# Patient Record
Sex: FEMALE | Race: WHITE | URBAN - METROPOLITAN AREA
[De-identification: names, ages, dates, MRNs, and addresses within clinical notes are randomized per-mention and may not be internally consistent; named-entity substitution may affect disease eponyms.]

---

## 2020-11-12 ENCOUNTER — OFFICE VISIT (OUTPATIENT)
Dept: URBAN - METROPOLITAN AREA CLINIC 31 | Facility: CLINIC | Age: 85
End: 2020-11-12
Payer: MEDICARE

## 2020-11-12 DIAGNOSIS — H35.3231 EXUDATIVE AGE-REL MCLR DEGN, BI, WITH ACTV CHRDL NEOVAS: Primary | ICD-10-CM

## 2020-11-12 PROCEDURE — 67028 INJECTION EYE DRUG: CPT | Performed by: OPHTHALMOLOGY

## 2020-11-12 PROCEDURE — 92014 COMPRE OPH EXAM EST PT 1/>: CPT | Performed by: OPHTHALMOLOGY

## 2020-11-12 PROCEDURE — 92134 CPTRZ OPH DX IMG PST SGM RTA: CPT | Performed by: OPHTHALMOLOGY

## 2020-11-12 ASSESSMENT — INTRAOCULAR PRESSURE
OD: 19
OS: 19

## 2020-11-12 NOTE — IMPRESSION/PLAN
Impression: Vitreous degeneration, left eye: H43.812. Plan: No retinal breaks were identified on exam.  The findings were discussed with the patient. The signs and symptoms of a retinal tear and detachment were again reviewed with the patient. The patient was advised to return if they noted any new floaters, flashing lights or a shadow in their vision.

## 2020-11-12 NOTE — IMPRESSION/PLAN
Impression: Exudative age-rel mclr degn, bi, with actv chrdl neovas: H35.3231. OU.
DS OD
s/p Lucentis OS 8/27/2020  (stable at q 3 months)
s/p Lucentis OD 11/29/18 (prn) OCT OD = scar, with central IRF OD no SRF/IRF OS  with CMT  292/199 +1 Plan: OD:  Some fluid, but no IRH, and no SRH in periphery of scar OS: No activity, better eye. Rec treatment OS for maint. May consider OD as well to reduce spread of scar, as SRF in mid-periph. RBAC's d/w patient who elects obs OD today and treat OS. Discussed R,B,A of Avastin vs Lucentis vs Eylea injection. Discussed signs and symptoms of retinal detachment. Patient understands and wishes to proceed with Lucentis injection today. Timeout was performed before procedure. After 2% Subconjunctival anesthesia & betadine prep, Lucentis OS was injected with no complications. Overfill discarded. 

3m OCT OU re-eval Adarsh OU

## 2021-03-01 ENCOUNTER — OFFICE VISIT (OUTPATIENT)
Dept: URBAN - METROPOLITAN AREA CLINIC 13 | Facility: CLINIC | Age: 86
End: 2021-03-01
Payer: MEDICARE

## 2021-03-01 PROCEDURE — 67028 INJECTION EYE DRUG: CPT | Performed by: OPHTHALMOLOGY

## 2021-03-01 PROCEDURE — 92134 CPTRZ OPH DX IMG PST SGM RTA: CPT | Performed by: OPHTHALMOLOGY

## 2021-03-01 PROCEDURE — 99214 OFFICE O/P EST MOD 30 MIN: CPT | Performed by: OPHTHALMOLOGY

## 2021-03-01 ASSESSMENT — INTRAOCULAR PRESSURE
OD: 19
OS: 19

## 2021-03-01 NOTE — IMPRESSION/PLAN
Impression: Exudative age-rel mclr degn, bi, with actv chrdl neovas: H35.3231. OU.
DS OD
s/p Lucentis OS 11/12/2020  (stable at q 3 months)
s/p Lucentis OD 11/29/18 (prn) OCT OD = scar, with central IRF OD no SRF/IRF OS  with /288 Plan: OD:  Some fluid, but no IRH, and no SRH in periphery of scar OS: No activity, better eye @ 3m -- today mild fluid at 3 1/2m Rec treatment OS for maint at 3m interval.  May consider OD as well to reduce spread of scar, as SRF in mid-periph. RBAC's d/w patient who elects obs OD today and treat OS. Discussed R,B,A of Avastin vs Lucentis vs Eylea injection. Discussed signs and symptoms of retinal detachment. Patient understands and wishes to proceed with Lucentis injection today. Timeout was performed before procedure. After 2% Subconjunctival anesthesia & betadine prep, Lucentis OS was injected with no complications. Overfill discarded. 

3m OCT OU re-eval Adarsh OU

## 2021-06-10 ENCOUNTER — OFFICE VISIT (OUTPATIENT)
Dept: URBAN - METROPOLITAN AREA CLINIC 41 | Facility: CLINIC | Age: 86
End: 2021-06-10
Payer: MEDICARE

## 2021-06-10 DIAGNOSIS — H43.812 VITREOUS DEGENERATION, LEFT EYE: ICD-10-CM

## 2021-06-10 DIAGNOSIS — Z96.1 PRESENCE OF INTRAOCULAR LENS: ICD-10-CM

## 2021-06-10 PROCEDURE — 92134 CPTRZ OPH DX IMG PST SGM RTA: CPT | Performed by: OPHTHALMOLOGY

## 2021-06-10 PROCEDURE — 67028 INJECTION EYE DRUG: CPT | Performed by: OPHTHALMOLOGY

## 2021-06-10 ASSESSMENT — INTRAOCULAR PRESSURE
OD: 17
OS: 17

## 2021-06-10 NOTE — IMPRESSION/PLAN
Impression: Exudative age-rel mclr degn, bi, with actv chrdl neovas: H35.3231. OU.
DS OD
s/p Lucentis OS 03/01/2021 (stable at q 3 months)
s/p Lucentis OD 11/29/18 (prn) OCT OD = scar, with central IRF OD; increasing SRF OS  with  / 305 Plan: OD:  Some fluid, but no IRH, and no SRH in periphery of scar OS: Had no activity, better eye @ 3m -- today increased fluid at 3m Rec treatment OS with tighter interval.
May consider OD as well to reduce spread of scar, as SRF in mid-periph. RBAC's d/w patient who elects obs OD today and treat OS. Discussed R,B,A of Avastin vs Lucentis vs Eylea injection. Discussed signs and symptoms of retinal detachment. Patient understands and wishes to proceed with Lucentis injection today. Timeout was performed before procedure. After 2% Subconjunctival anesthesia & betadine prep, Lucentis OS was injected with no complications. Overfill discarded. 

1m OCT OU Adarsh OS 2/3

## 2021-07-22 ENCOUNTER — PROCEDURE (OUTPATIENT)
Dept: URBAN - METROPOLITAN AREA CLINIC 41 | Facility: CLINIC | Age: 86
End: 2021-07-22
Payer: MEDICARE

## 2021-07-22 PROCEDURE — 92134 CPTRZ OPH DX IMG PST SGM RTA: CPT | Performed by: OPHTHALMOLOGY

## 2021-07-22 ASSESSMENT — INTRAOCULAR PRESSURE
OD: 18
OS: 19

## 2021-08-23 ENCOUNTER — PROCEDURE (OUTPATIENT)
Dept: URBAN - METROPOLITAN AREA CLINIC 41 | Facility: CLINIC | Age: 86
End: 2021-08-23
Payer: MEDICARE

## 2021-08-23 PROCEDURE — 92134 CPTRZ OPH DX IMG PST SGM RTA: CPT | Performed by: OPHTHALMOLOGY

## 2021-08-23 PROCEDURE — 67028 INJECTION EYE DRUG: CPT | Performed by: OPHTHALMOLOGY

## 2021-08-23 ASSESSMENT — INTRAOCULAR PRESSURE
OS: 24
OD: 23

## 2021-10-28 ENCOUNTER — OFFICE VISIT (OUTPATIENT)
Dept: URBAN - METROPOLITAN AREA CLINIC 41 | Facility: CLINIC | Age: 86
End: 2021-10-28
Payer: MEDICARE

## 2021-10-28 PROCEDURE — 99214 OFFICE O/P EST MOD 30 MIN: CPT | Performed by: OPHTHALMOLOGY

## 2021-10-28 PROCEDURE — 67028 INJECTION EYE DRUG: CPT | Performed by: OPHTHALMOLOGY

## 2021-10-28 PROCEDURE — 92134 CPTRZ OPH DX IMG PST SGM RTA: CPT | Performed by: OPHTHALMOLOGY

## 2021-10-28 ASSESSMENT — INTRAOCULAR PRESSURE
OS: 20
OD: 19

## 2021-10-28 NOTE — IMPRESSION/PLAN
Impression: Exudative age-rel mclr degn, bi, with actv chrdl neovas: H35.3231. OU.
DS OD
s/p Lucentis OS 08/23/2021
s/p Lucentis OD 11/29/18 (prn) OCT OD = scar, with central IRF OD; increasing SRF/IRF OS  / 259 Plan: OD:  Some fluid, but no IRH, and no SRH in periphery of scar OS: Had no activity, better eye @ 3m; then worse at 3m; better with monthly treatment and worse again today at 2m OD: May consider OD as well to reduce spread of scar should SRF be in periph
OS: Rec treatment OS with q1m Adarsh vs switch to Joel MICHAEL's d/w patient who elects obs OD today and treat with Eylea OS. Discussed R,B,A of Avastin vs Lucentis vs Eylea vs Beovu injection. Discussed signs and symptoms of inflammation, endophthalmitis, vitreous hemorrhage, retinal tear, retinal detachment. Patient understands and wishes to proceed with Eylea injection today. Timeout was performed before procedure. After 2% Subconjunctival anesthesia & betadine prep, Eylea  was injected with no complications. Overfill discarded. 

1m OCT OU re-eval Eylea OS

## 2021-12-02 ENCOUNTER — OFFICE VISIT (OUTPATIENT)
Dept: URBAN - METROPOLITAN AREA CLINIC 31 | Facility: CLINIC | Age: 86
End: 2021-12-02
Payer: MEDICARE

## 2021-12-02 PROCEDURE — 92012 INTRM OPH EXAM EST PATIENT: CPT | Performed by: OPHTHALMOLOGY

## 2021-12-02 PROCEDURE — 92134 CPTRZ OPH DX IMG PST SGM RTA: CPT | Performed by: OPHTHALMOLOGY

## 2021-12-02 PROCEDURE — 67028 INJECTION EYE DRUG: CPT | Performed by: OPHTHALMOLOGY

## 2021-12-02 ASSESSMENT — INTRAOCULAR PRESSURE
OS: 13
OD: 18

## 2021-12-02 NOTE — IMPRESSION/PLAN
Impression: Exudative age-rel mclr degn, bi, with actv chrdl neovas: H35.3231. OU.
DS OD
s/p Eylea OS 10/28/2021-[facility]
s/p Lucentis OD 11/29/18 (prn) OCT OD = scar, with central IRF OD; increasing SRF/IRF OS / 185 Plan: OD:  Some fluid, but no IRH, and no SRH in periphery of scar OS:  Patient notes improvement after switch to [facility]. Exam and OCT demonstrate improved. Rec Eylea OS today and maintain 4-6 wk follow up. OD: May consider OD as well to reduce spread of scar should SRF be in periph
OS: Rec  Eylea RBAC's d/w patient who elects obs OD today and treat with Eylea OS. Discussed R,B,A of Avastin vs Lucentis vs Eylea vs Beovu injection. Discussed signs and symptoms of inflammation, endophthalmitis, vitreous hemorrhage, retinal tear, retinal detachment. Patient understands and wishes to proceed with Eylea injection today. Timeout was performed before procedure. After 2% Subconjunctival anesthesia & betadine prep, Eylea  was injected with no complications. Overfill discarded. 

1m OCT OU re-eval Eylea OS ([facility])

## 2022-01-14 ENCOUNTER — OFFICE VISIT (OUTPATIENT)
Dept: URBAN - METROPOLITAN AREA CLINIC 41 | Facility: CLINIC | Age: 87
End: 2022-01-14
Payer: MEDICARE

## 2022-01-14 PROCEDURE — 67028 INJECTION EYE DRUG: CPT | Performed by: OPHTHALMOLOGY

## 2022-01-14 PROCEDURE — 92134 CPTRZ OPH DX IMG PST SGM RTA: CPT | Performed by: OPHTHALMOLOGY

## 2022-01-14 ASSESSMENT — INTRAOCULAR PRESSURE
OS: 19
OD: 21

## 2022-01-14 NOTE — IMPRESSION/PLAN
Impression: Exudative age-rel mclr degn, bi, with actv chrdl neovas: H35.3231. OU.
DS OD
s/p Eylea OS 12/02/2021
s/p Lucentis OD 11/29/18 (prn) OCT OD = scar, with central IRF OD; increasing SRF/IRF OS  / 182 Plan: OD:  Some fluid, but no IRH, and no SRH in periphery of scar OS:  Resolved fluid on Eylea OD: May consider OD as well to reduce spread of scar should SRF be in periph = obs for now OS: Rec  Eylea & ext
RBAC's d/w patient who elects obs OD today and treat with Eylea OS. Discussed R,B,A of Avastin vs Lucentis vs Eylea vs Beovu injection. Discussed signs and symptoms of inflammation, endophthalmitis, vitreous hemorrhage, retinal tear, retinal detachment. Patient understands and wishes to proceed with Eylea injection today. Timeout was performed before procedure. After 2% Subconjunctival anesthesia & betadine prep, Eylea  was injected with no complications. Overfill discarded. 

2m OCT OU re-eval Eylea OS

## 2022-03-11 ENCOUNTER — OFFICE VISIT (OUTPATIENT)
Dept: URBAN - METROPOLITAN AREA CLINIC 41 | Facility: CLINIC | Age: 87
End: 2022-03-11
Payer: MEDICARE

## 2022-03-11 PROCEDURE — 92134 CPTRZ OPH DX IMG PST SGM RTA: CPT | Performed by: OPHTHALMOLOGY

## 2022-03-11 PROCEDURE — 67028 INJECTION EYE DRUG: CPT | Performed by: OPHTHALMOLOGY

## 2022-03-11 ASSESSMENT — INTRAOCULAR PRESSURE
OS: 16
OD: 15

## 2022-03-11 NOTE — IMPRESSION/PLAN
Impression: Exudative age-rel mclr degn, bi, with actv chrdl neovas: H35.3231. OU.
DS OD
s/p Eylea OS 01/14/2022
s/p Lucentis OD 11/29/18 (prn) OCT OD = scar, with central IRF OD; increasing SRF/IRF OS CST 37 / 183 Plan: OD:  Some fluid, but no IRH, and no SRH in periphery of scar OS:  Resolved fluid on Eylea OD: May consider OD as well to reduce spread of scar should SRF be in periph = obs for now OS: Rec  Eylea & ext
RBAC's d/w patient who elects obs OD today and treat and ext with Eylea OS. Discussed R,B,A of Avastin vs Lucentis vs Eylea vs Beovu injection. Discussed signs and symptoms of inflammation, endophthalmitis, vitreous hemorrhage, retinal tear, retinal detachment. Patient understands and wishes to proceed with Eylea injection today. Timeout was performed before procedure. After 2% Subconjunctival anesthesia & betadine prep, Eylea  was injected with no complications. Overfill discarded. 

3m OCT OU re-eval Eylea OS

## 2022-10-17 ENCOUNTER — OFFICE VISIT (OUTPATIENT)
Dept: URBAN - METROPOLITAN AREA CLINIC 13 | Facility: CLINIC | Age: 87
End: 2022-10-17
Payer: MEDICARE

## 2022-10-17 DIAGNOSIS — H35.3231 EXUDATIVE AGE-REL MCLR DEGN, BI, WITH ACTV CHRDL NEOVAS: Primary | ICD-10-CM

## 2022-10-17 DIAGNOSIS — Z96.1 PRESENCE OF INTRAOCULAR LENS: ICD-10-CM

## 2022-10-17 DIAGNOSIS — H43.812 VITREOUS DEGENERATION, LEFT EYE: ICD-10-CM

## 2022-10-17 PROCEDURE — 92134 CPTRZ OPH DX IMG PST SGM RTA: CPT | Performed by: OPHTHALMOLOGY

## 2022-10-17 PROCEDURE — 99214 OFFICE O/P EST MOD 30 MIN: CPT | Performed by: OPHTHALMOLOGY

## 2022-10-17 PROCEDURE — 67028 INJECTION EYE DRUG: CPT | Performed by: OPHTHALMOLOGY

## 2022-10-17 ASSESSMENT — INTRAOCULAR PRESSURE
OD: 18
OS: 15

## 2022-10-17 NOTE — IMPRESSION/PLAN
Impression: Exudative age-rel mclr degn, bi, with actv chrdl neovas: H35.3231. OU.
DS OD
OCT OD = scar, with central IRF OD; increasing SRF/IRF OS  / 234 
s/p Eylea OS - 03/11/2022 (8/14/2022 in [place])
s/p Lucentis OD 11/29/18 (prn) Plan: OD:  Some fluid, but no IRH, and no SRH in periphery of scar OS:  Resolved fluid on Eylea - 2m post treatment OD: May consider OD as well to reduce spread of scar should SRF be in periph = obs for now OS: Rec Eylea  at 2m vs ext RBA's d/w patient who elects obs OD today and treat and ext with Eylea OS. Discussed R,B,A of Avastin vs Lucentis vs Eylea vs Beovu injection. Discussed signs and symptoms of inflammation, endophthalmitis, vitreous hemorrhage, retinal tear, retinal detachment. Patient understands and wishes to proceed with Eylea injection today. Timeout was performed before procedure. After 2% Subconjunctival anesthesia & betadine prep, Eylea  was injected with no complications. Overfill discarded. 

3m OCT OU re-eval Eylea OS

## 2023-06-07 ENCOUNTER — OFFICE VISIT (OUTPATIENT)
Dept: URBAN - METROPOLITAN AREA CLINIC 13 | Facility: CLINIC | Age: 88
End: 2023-06-07
Payer: MEDICARE

## 2023-06-07 DIAGNOSIS — Z96.1 PRESENCE OF INTRAOCULAR LENS: ICD-10-CM

## 2023-06-07 DIAGNOSIS — H35.3231 EXUDATIVE AGE-REL MCLR DEGN, BI, WITH ACTV CHRDL NEOVAS: Primary | ICD-10-CM

## 2023-06-07 DIAGNOSIS — H43.812 VITREOUS DEGENERATION, LEFT EYE: ICD-10-CM

## 2023-06-07 DIAGNOSIS — H35.3123 NONEXUDATIVE AGE-RELATED MACULAR DEGENERATION OF LEFT EYE, ADVANCED ATROPHIC W/O SUBFOVEAL INVOLVEMENT: ICD-10-CM

## 2023-06-07 PROCEDURE — 99214 OFFICE O/P EST MOD 30 MIN: CPT | Performed by: OPHTHALMOLOGY

## 2023-06-07 PROCEDURE — 92134 CPTRZ OPH DX IMG PST SGM RTA: CPT | Performed by: OPHTHALMOLOGY

## 2023-06-07 PROCEDURE — 67028 INJECTION EYE DRUG: CPT | Performed by: OPHTHALMOLOGY

## 2023-06-07 ASSESSMENT — INTRAOCULAR PRESSURE
OD: 18
OS: 19

## 2023-06-07 NOTE — IMPRESSION/PLAN
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 Plan: Please see above.

## 2023-06-07 NOTE — IMPRESSION/PLAN
Impression: Exudative age-rel mclr degn, bi, with actv chrdl neovas: H35.3231. OU.
DS OD
OCT OD = scar, with central IRF OD; increasing SRF/IRF OS  / 226 
s/p Eylea OS - 04/20/2023 (in [place]) 
s/p Lucentis OD 11/29/18  Plan: OD:  Some fluid, but no IRH, and no SRH in periphery of scar OS:  Resolved fluid on Eylea - does have some areas of atrophy as well OD: May consider OD as well to reduce spread of scar should SRF be in periph = obs for now OS: Rec continue Eylea at q2m and consider Syfovre in the future. Discussed R,B,A of Avastin vs Lucentis vs Eylea vs Beovu injection. Discussed signs and symptoms of inflammation, endophthalmitis, vitreous hemorrhage, retinal tear, retinal detachment. Patient understands and wishes to proceed with Eylea injection today. Timeout was performed before procedure. After 2% Subconjunctival anesthesia & betadine prep, Eylea  was injected with no complications. Overfill discarded. 

2m OCT OU re-eval Eylea OS